# Patient Record
Sex: FEMALE | Race: WHITE | NOT HISPANIC OR LATINO | Employment: OTHER | URBAN - METROPOLITAN AREA
[De-identification: names, ages, dates, MRNs, and addresses within clinical notes are randomized per-mention and may not be internally consistent; named-entity substitution may affect disease eponyms.]

---

## 2017-01-17 ENCOUNTER — OFFICE VISIT (OUTPATIENT)
Dept: NEPHROLOGY | Facility: MEDICAL CENTER | Age: 77
End: 2017-01-17
Payer: COMMERCIAL

## 2017-01-17 VITALS
SYSTOLIC BLOOD PRESSURE: 140 MMHG | TEMPERATURE: 97.5 F | HEIGHT: 64 IN | RESPIRATION RATE: 14 BRPM | HEART RATE: 74 BPM | WEIGHT: 174 LBS | DIASTOLIC BLOOD PRESSURE: 80 MMHG | BODY MASS INDEX: 29.71 KG/M2

## 2017-01-17 DIAGNOSIS — E87.1 HYPONATREMIA: ICD-10-CM

## 2017-01-17 DIAGNOSIS — N18.30 CKD (CHRONIC KIDNEY DISEASE) STAGE 3, GFR 30-59 ML/MIN (HCC): ICD-10-CM

## 2017-01-17 DIAGNOSIS — I48.0 PAROXYSMAL ATRIAL FIBRILLATION (HCC): ICD-10-CM

## 2017-01-17 DIAGNOSIS — I10 ESSENTIAL HYPERTENSION: ICD-10-CM

## 2017-01-17 PROCEDURE — 99204 OFFICE O/P NEW MOD 45 MIN: CPT | Performed by: INTERNAL MEDICINE

## 2017-01-17 ASSESSMENT — ENCOUNTER SYMPTOMS
CHILLS: 0
NAUSEA: 0
FEVER: 0
ORTHOPNEA: 0
NECK PAIN: 0
HYPERTENSION: 1
SHORTNESS OF BREATH: 0
MYALGIAS: 0
VOMITING: 0

## 2017-01-17 NOTE — MR AVS SNAPSHOT
"        Lilia Pineda   2017 10:45 AM   Office Visit   MRN: [id number]    Department:  [facility]   Dept Phone:  [phone]    Description:  Female : 1940   Provider:  Fadi Najjar, M.D.           Reason for Visit     New Patient           Allergies as of 2017     No Known Allergies      You were diagnosed with     CKD (chronic kidney disease) stage 3, GFR 30-59 ml/min   [215070]       Essential hypertension   [5554194]       Hyponatremia   [597600]       Paroxysmal atrial fibrillation (CMS-HCC)   [948505]         Vital Signs     Blood Pressure Pulse Temperature Respirations Height Weight    140/80 mmHg 74 36.4 °C (97.5 °F) (Temporal) 14 1.626 m (5' 4\") 78.926 kg (174 lb)    Body Mass Index Smoking Status                29.85 kg/m2 Never Smoker           Basic Information     Date Of Birth Sex Race Ethnicity Preferred Language    1940 Female White Non- English      Your appointments     2017 10:45 AM   New Patient with Fadi Najjar, M.D.   [facility] ([facility] [place])    [address] [place] [address] [facility] [address]  [place] [address]   [phone]           Please bring Photo ID, Insurance Cards, All Medication Bottles and copies of any legal documents (such as Living Will, Power of ) If speaking a language besides English please bring an adult . Please arrive 30 minutes prior for check in and registration. You will be receiving a confirmation call a few days before your appointment from our automated call confirmation system.            2017 11:40 AM   ANNUAL EXAM PREVENTATIVE with MONY Pimentel   [facility] [address]  [place] [address]   [phone]            Mar 28, 2017  9:45 AM   Follow Up Visit with Fadi Najjar, M.D.   [facility] ([facility] [place])    [address] [place] [address] [facility] [address]  [place] [address]   [phone]           You will " be receiving a confirmation call a few days before your appointment from our automated call confirmation system.            Jun 28, 2017 10:00 AM   FOLLOW UP with Manolo Luis M.D.   [facility] (--)    [address]  [place] [address]   [phone]              Problem List              ICD-10-CM Priority Class Noted - Resolved    Hypertension I10 Medium  10/29/2013 - Present    Hyperlipidemia E78.5 Medium  10/29/2013 - Present    History of TIA (transient ischemic attack) Z86.73 Low  10/29/2013 - Present    Chronic back pain greater than 3 months duration M54.9, G89.29   10/31/2013 - Present    Neck pain M54.2   1/9/2014 - Present    Chronic post-traumatic headache G44.329 Medium  1/9/2014 - Present    Vascular abnormality I99.9   2/27/2014 - Present    Anxiety F41.9   10/6/2014 - Present    Risk for falls Z91.81 Low  10/26/2015 - Present    CKD (chronic kidney disease) stage 3, GFR 30-59 ml/min N18.3 High  10/27/2015 - Present    Chronic anticoagulation Z79.01   4/8/2016 - Present    Heart block AV first degree I44.0   4/8/2016 - Present    Paroxysmal atrial fibrillation (CMS-HCC) I48.0   6/29/2016 - Present    High serum creatinine R79.89   7/5/2016 - Present    Anticoagulant long-term use Z79.01   12/27/2016 - Present    Hyponatremia E87.1   1/17/2017 - Present      Health Maintenance        Date Due Completion Dates    IMM DTaP/Tdap/Td Vaccine (1 - Tdap) 1/6/1957 ---    IMM ZOSTER VACCINE 1/6/1998 ---    BONE DENSITY 1/6/2003 ---    MAMMOGRAM 1/1/2012 1/1/2011 (Done)    Override on 1/1/2011: Done (NL)    COLONOSCOPY 1/1/2015 1/1/2005 (Done)    Override on 1/1/2005: Done (1 polyp.  normal per patient.)            Current Immunizations     13-VALENT PCV PREVNAR 10/4/2016    Influenza TIV (IM) 10/19/2015, 10/10/2013    Influenza Vaccine Adult HD 10/4/2016    Pneumococcal polysaccharide vaccine (PPSV-23) 1/10/2006      Below and/or attached are the medications  your provider expects you to take. Review all of your home medications and newly ordered medications with your provider and/or pharmacist. Follow medication instructions as directed by your provider and/or pharmacist. Please keep your medication list with you and share with your provider. Update the information when medications are discontinued, doses are changed, or new medications (including over-the-counter products) are added; and carry medication information at all times in the event of emergency situations     Allergies:  No Known Allergies          Medications  Valid as of: January 17, 2017 - 10:43 AM    Generic Name Brand Name Tablet Size Instructions for use    Acetaminophen-Codeine (Tab) TYLENOL #3 300-30 MG TAKE 1 TABLET BY MOUTH EVERY 6 HOURS AS NEEDED        Amitriptyline HCl (Tab) ELAVIL 75 MG TAKE 1 TABLET BY MOUTH EVERY BEDTIME.        Atorvastatin Calcium (Tab) LIPITOR 20 MG Take 1 Tab by mouth every bedtime.        DiazePAM (Tab) VALIUM 10 MG TAKE 1 TABLET BY MOUTH TWICE A DAY AS NEEDED FOR ANXIETY.        Gemfibrozil (Tab) LOPID 600 MG Take 1 Tab by mouth 2 times a day.        Lisinopril (Tab) PRINIVIL 10 MG Take 1 Tab by mouth 2 times a day.        Potassium Chloride Velia CR (Tab CR) K-DUR 20 MEQ Take 1 Tab by mouth 2 times a day.        Rivaroxaban (Tab) XARELTO 15 MG TAKE 1 TABLET BY MOUTH WITH DINNER.        Sotalol HCl (Tab) BETAPACE 80 MG Take 1 Tab by mouth every day.        TraZODone HCl (Tab) DESYREL 150 MG Take 1-2 Tabs by mouth every bedtime.        .                 Medicines prescribed today were sent to:     [id number] - [place] - [address]    [address] [place] [address]    Phone: [phone] Fax: [phone]    Open 24 Hours?: No    [facility] - [place] - [address] AT PORTAL TO REGISTERED [facility] [address]    [address] [facility] [address]    Phone: [phone] Fax: [phone]    Open 24 Hours?: No         Medication refill instructions:       If your prescription bottle indicates you have medication refills left, it is not necessary to call your provider’s office. Please contact your pharmacy and they will refill your medication.    If your prescription bottle indicates you do not have any refills left, you may request refills at any time through one of the following ways: The online [employer] system (except Urgent Care), by calling your provider’s office, or by asking your pharmacy to contact your provider’s office with a refill request. Medication refills are processed only during regular business hours and may not be available until the next business day. Your provider may request additional information or to have a follow-up visit with you prior to refilling your medication.   *Please Note: Medication refills are assigned a new Rx number when refilled electronically. Your pharmacy may indicate that no refills were authorized even though a new prescription for the same medication is available at the pharmacy. Please request the medicine by name with the pharmacy before contacting your provider for a refill.        Your To Do List     Future Labs/Procedures Complete By Expires    BASIC METABOLIC PANEL  As directed 1/17/2018         [employer] Status: Patient Declined

## 2017-01-18 NOTE — PROGRESS NOTES
"Subjective:      Lilia Pineda is a 77 y.o. female who presents with Chronic Kidney Disease and Hypertension            Chronic Kidney Disease  This is a chronic problem. The current episode started more than 1 year ago. The problem occurs constantly. The problem has been unchanged. Pertinent negatives include no chest pain, chills, fever, myalgias, nausea, neck pain, urinary symptoms or vomiting. Nothing aggravates the symptoms.   Hypertension  This is a chronic problem. The current episode started more than 1 year ago. The problem is unchanged. The problem is controlled. Pertinent negatives include no chest pain, neck pain, orthopnea, peripheral edema or shortness of breath. There are no associated agents to hypertension. Risk factors for coronary artery disease include post-menopausal state and dyslipidemia. Past treatments include diuretics and ACE inhibitors. The current treatment provides significant improvement. There are no compliance problems.  Hypertensive end-organ damage includes kidney disease. Identifiable causes of hypertension include chronic renal disease.       Review of Systems   Constitutional: Negative for fever and chills.   Respiratory: Negative for shortness of breath.    Cardiovascular: Negative for chest pain and orthopnea.   Gastrointestinal: Negative for nausea and vomiting.   Genitourinary: Negative for dysuria, urgency and frequency.   Musculoskeletal: Negative for myalgias and neck pain.   All other systems reviewed and are negative.         Objective:     /80 mmHg  Pulse 74  Temp(Src) 36.4 °C (97.5 °F) (Temporal)  Resp 14  Ht 1.626 m (5' 4\")  Wt 78.926 kg (174 lb)  BMI 29.85 kg/m2     Physical Exam   Constitutional: She is oriented to person, place, and time. She appears well-developed and well-nourished. No distress.   HENT:   Head: Normocephalic and atraumatic.   Nose: Nose normal.   Eyes: Right eye exhibits no discharge. Left eye exhibits no discharge. No scleral " icterus.   Neck: No JVD present. No tracheal deviation present. No thyromegaly present.   Cardiovascular: An irregular rhythm present.   No murmur heard.  Pulmonary/Chest: Effort normal and breath sounds normal. No respiratory distress. She has no wheezes.   Abdominal: Soft. She exhibits no distension. There is no tenderness.   Musculoskeletal: She exhibits no edema.   Lymphadenopathy:     She has no cervical adenopathy.   Neurological: She is alert and oriented to person, place, and time.   Skin: Skin is warm and dry. She is not diaphoretic. No erythema.   Psychiatric: She has a normal mood and affect. Her behavior is normal.   Nursing note and vitals reviewed.    PMH,SH,FH,Medication list and medication allergy were reviewed and updated today  Old records were reviewed          Assessment/Plan:     1. CKD (chronic kidney disease) stage 3, GFR 30-59 ml/min  Most likely is hypertensive nephrosclerosis  Patient has no uremic symptoms  Renal dose all medication  Avoid nephrotoxins  Decreased potassium supplement  Follow-up labs  2. Essential hypertension  Controlled  Continue low salt diet    3. Hyponatremia  Patient was taking hydrochlorothiazide  I advised the patient to stop hydrochlorothiazide  Repeat labs    4. Paroxysmal atrial fibrillation (CMS-HCC)

## 2017-01-30 ENCOUNTER — TELEPHONE (OUTPATIENT)
Dept: MEDICAL GROUP | Facility: CLINIC | Age: 77
End: 2017-01-30

## 2017-01-30 ENCOUNTER — HOSPITAL ENCOUNTER (OUTPATIENT)
Facility: MEDICAL CENTER | Age: 77
End: 2017-01-30
Attending: NURSE PRACTITIONER
Payer: COMMERCIAL

## 2017-01-30 ENCOUNTER — OFFICE VISIT (OUTPATIENT)
Dept: MEDICAL GROUP | Facility: CLINIC | Age: 77
End: 2017-01-30
Payer: COMMERCIAL

## 2017-01-30 VITALS
SYSTOLIC BLOOD PRESSURE: 138 MMHG | HEART RATE: 78 BPM | OXYGEN SATURATION: 95 % | BODY MASS INDEX: 29.71 KG/M2 | DIASTOLIC BLOOD PRESSURE: 70 MMHG | WEIGHT: 174 LBS | TEMPERATURE: 96.9 F | HEIGHT: 64 IN | RESPIRATION RATE: 16 BRPM

## 2017-01-30 DIAGNOSIS — Z00.00 MEDICARE ANNUAL WELLNESS VISIT, SUBSEQUENT: ICD-10-CM

## 2017-01-30 DIAGNOSIS — I10 ESSENTIAL HYPERTENSION: ICD-10-CM

## 2017-01-30 DIAGNOSIS — E78.49 OTHER HYPERLIPIDEMIA: ICD-10-CM

## 2017-01-30 DIAGNOSIS — N18.30 CKD (CHRONIC KIDNEY DISEASE) STAGE 3, GFR 30-59 ML/MIN (HCC): ICD-10-CM

## 2017-01-30 DIAGNOSIS — I48.0 PAROXYSMAL ATRIAL FIBRILLATION (HCC): ICD-10-CM

## 2017-01-30 DIAGNOSIS — F51.01 PRIMARY INSOMNIA: ICD-10-CM

## 2017-01-30 DIAGNOSIS — I44.0 HEART BLOCK AV FIRST DEGREE: ICD-10-CM

## 2017-01-30 DIAGNOSIS — F41.9 ANXIETY: ICD-10-CM

## 2017-01-30 DIAGNOSIS — R39.198 SUBJECTIVE CHANGE IN URINATION: ICD-10-CM

## 2017-01-30 DIAGNOSIS — F33.1 MODERATE EPISODE OF RECURRENT MAJOR DEPRESSIVE DISORDER (HCC): ICD-10-CM

## 2017-01-30 DIAGNOSIS — Z79.01 ANTICOAGULANT LONG-TERM USE: ICD-10-CM

## 2017-01-30 DIAGNOSIS — Z78.0 POSTMENOPAUSAL: ICD-10-CM

## 2017-01-30 PROBLEM — F32.9 MAJOR DEPRESSIVE DISORDER: Status: ACTIVE | Noted: 2017-01-30

## 2017-01-30 LAB
APPEARANCE UR: NORMAL
BILIRUB UR STRIP-MCNC: NORMAL MG/DL
COLOR UR AUTO: YELLOW
GLUCOSE UR STRIP.AUTO-MCNC: NORMAL MG/DL
KETONES UR STRIP.AUTO-MCNC: NORMAL MG/DL
LEUKOCYTE ESTERASE UR QL STRIP.AUTO: NORMAL
NITRITE UR QL STRIP.AUTO: POSITIVE
PH UR STRIP.AUTO: 7.5 [PH] (ref 5–8)
PROT UR QL STRIP: NORMAL MG/DL
RBC UR QL AUTO: NORMAL
SP GR UR STRIP.AUTO: 1.02
UROBILINOGEN UR STRIP-MCNC: NORMAL MG/DL

## 2017-01-30 PROCEDURE — G8510 SCR DEP NEG, NO PLAN REQD: HCPCS | Performed by: NURSE PRACTITIONER

## 2017-01-30 PROCEDURE — G8420 CALC BMI NORM PARAMETERS: HCPCS | Performed by: NURSE PRACTITIONER

## 2017-01-30 PROCEDURE — G8482 FLU IMMUNIZE ORDER/ADMIN: HCPCS | Performed by: NURSE PRACTITIONER

## 2017-01-30 PROCEDURE — 1036F TOBACCO NON-USER: CPT | Performed by: NURSE PRACTITIONER

## 2017-01-30 PROCEDURE — 1101F PT FALLS ASSESS-DOCD LE1/YR: CPT | Performed by: NURSE PRACTITIONER

## 2017-01-30 PROCEDURE — 4040F PNEUMOC VAC/ADMIN/RCVD: CPT | Performed by: NURSE PRACTITIONER

## 2017-01-30 PROCEDURE — 81002 URINALYSIS NONAUTO W/O SCOPE: CPT | Performed by: NURSE PRACTITIONER

## 2017-01-30 PROCEDURE — 99214 OFFICE O/P EST MOD 30 MIN: CPT | Mod: 25 | Performed by: NURSE PRACTITIONER

## 2017-01-30 PROCEDURE — 87086 URINE CULTURE/COLONY COUNT: CPT

## 2017-01-30 PROCEDURE — G0439 PPPS, SUBSEQ VISIT: HCPCS | Performed by: NURSE PRACTITIONER

## 2017-01-30 RX ORDER — NITROFURANTOIN 25; 75 MG/1; MG/1
100 CAPSULE ORAL 2 TIMES DAILY
Qty: 14 CAP | Refills: 0 | Status: SHIPPED | OUTPATIENT
Start: 2017-01-30 | End: 2017-02-06

## 2017-01-30 ASSESSMENT — PATIENT HEALTH QUESTIONNAIRE - PHQ9
5. POOR APPETITE OR OVEREATING: 3 - NEARLY EVERY DAY
CLINICAL INTERPRETATION OF PHQ2 SCORE: 3
SUM OF ALL RESPONSES TO PHQ QUESTIONS 1-9: 12
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
## 2017-01-30 NOTE — TELEPHONE ENCOUNTER
----- Message from MONY Pimentel sent at 1/30/2017 12:27 PM PST -----  Please call and let her know that I sent macrbid BID for 7 days to her pharm.  cx ordered.

## 2017-01-30 NOTE — MR AVS SNAPSHOT
"        Lilia Pineda   2017 12:00 PM   Office Visit   MRN: [id number]    Department:  [facility]   Dept Phone:  [phone]    Description:  Female : 1940   Provider:  MONY Pimentel           Reason for Visit     Annual Exam           Allergies as of 2017     Allergen Noted Reactions    Donnatal 2017       Other reaction(s): Headaches  Increased urination.    Sulfa Drugs 2017      You were diagnosed with     Medicare annual wellness visit, subsequent   [275405]       Moderate episode of recurrent major depressive disorder (CMS-HCC)   [4577886]       Anxiety   [803864]       Primary insomnia   [909117]       CKD (chronic kidney disease) stage 3, GFR 30-59 ml/min   [607346]       Other hyperlipidemia   [4810630]       Essential hypertension   [5250294]       Anticoagulant long-term use   [115933]       Paroxysmal atrial fibrillation (CMS-[facility])   [912139]       Heart block AV first degree   [088228]       Subjective change in urination   [209214]       Postmenopausal   [170484]         Vital Signs     Blood Pressure Pulse Temperature Respirations Height Weight    138/70 mmHg 78 36.1 °C (96.9 °F) 16 1.626 m (5' 4\") 78.926 kg (174 lb)    Body Mass Index Oxygen Saturation Smoking Status             29.85 kg/m2 95% Never Smoker          Basic Information     Date Of Birth Sex Race Ethnicity Preferred Language    1940 Female White Non- English      Your appointments     Mar 28, 2017  9:45 AM   Follow Up Visit with Fadi Najjar, M.D.   [facility] ([facility])    [address] [place] [address] [address]  [facility] [address]   [phone]           You will be receiving a confirmation call a few days before your appointment from our automated call confirmation system.            2017 10:00 AM   FOLLOW UP with Manolo Luis M.D.   [facility] (--)    [address] [facility]  [place] [address]   [phone]              Problem List              ICD-10-CM Priority Class Noted - Resolved    Hypertension I10 Medium  10/29/2013 - Present    Hyperlipidemia E78.5 Medium  10/29/2013 - Present    History of TIA (transient ischemic attack) Z86.73 Low  10/29/2013 - Present    Chronic back pain greater than 3 months duration M54.9, G89.29 Low  10/31/2013 - Present    Neck pain M54.2 Low  1/9/2014 - Present    Chronic post-traumatic headache G44.329 Medium  1/9/2014 - Present    Vascular abnormality I99.9   2/27/2014 - Present    Anxiety F41.9 Low  10/6/2014 - Present    Risk for falls Z91.81 Low  10/26/2015 - Present    CKD (chronic kidney disease) stage 3, GFR 30-59 ml/min N18.3 High  10/27/2015 - Present    Heart block AV first degree I44.0 Low  4/8/2016 - Present    Paroxysmal atrial fibrillation (CMS-HCC) I48.0 Medium  6/29/2016 - Present    history of High serum creatinine R79.89 Medium  7/5/2016 - Present    Anticoagulant long-term use-Xarelto Z79.01 High  12/27/2016 - Present    Hyponatremia E87.1 Low  1/17/2017 - Present    Steatosis of liver K76.0 Low  4/19/2007 - Present    Keratoconjunctivitis sicca, in Sjogren's syndrome (CMS-HCC) M35.01 Low  2/7/2012 - Present    Major depressive disorder F32.9   1/30/2017 - Present      Health Maintenance        Date Due Completion Dates    IMM DTaP/Tdap/Td Vaccine (1 - Tdap) 1/6/1957 ---    IMM ZOSTER VACCINE 1/6/1998 ---    BONE DENSITY 1/6/2003 ---            Results     POCT Urinalysis      Component Value Standard Range & Units    POC Color yellow Negative    POC Appearance cloudy Negative    POC Leukocyte Esterase large Negative    POC Nitrites positive Negative    POC Urobiligen neg Negative (0.2) mg/dL    POC Protein trace Negative mg/dL    POC Urine PH 7.5 5.0 - 8.0    POC Blood neg Negative    POC Specific Gravity 1.020 <1.005 - >1.030    POC Ketones neg Negative mg/dL    POC Biliruben neg Negative mg/dL    POC Glucose neg Negative mg/dL                         Current Immunizations     13-VALENT PCV PREVNAR 10/4/2016    Influenza TIV (IM) 10/19/2015, 10/10/2013    Influenza Vaccine Adult HD 10/4/2016    Pneumococcal polysaccharide vaccine (PPSV-23) 1/10/2006      Below and/or attached are the medications your provider expects you to take. Review all of your home medications and newly ordered medications with your provider and/or pharmacist. Follow medication instructions as directed by your provider and/or pharmacist. Please keep your medication list with you and share with your provider. Update the information when medications are discontinued, doses are changed, or new medications (including over-the-counter products) are added; and carry medication information at all times in the event of emergency situations     Allergies:  DONNATAL - (reactions not documented)     SULFA DRUGS - (reactions not documented)               Medications  Valid as of: January 30, 2017 - 12:05 PM    Generic Name Brand Name Tablet Size Instructions for use    Acetaminophen-Codeine (Tab) TYLENOL #3 300-30 MG TAKE 1 TABLET BY MOUTH EVERY 6 HOURS AS NEEDED        Amitriptyline HCl (Tab) ELAVIL 75 MG TAKE 1 TABLET BY MOUTH EVERY BEDTIME.        Atorvastatin Calcium (Tab) LIPITOR 20 MG Take 1 Tab by mouth every bedtime.        DiazePAM (Tab) VALIUM 10 MG TAKE 1 TABLET BY MOUTH TWICE A DAY AS NEEDED FOR ANXIETY.        Gemfibrozil (Tab) LOPID 600 MG Take 1 Tab by mouth 2 times a day.        Lisinopril (Tab) PRINIVIL 10 MG Take 1 Tab by mouth 2 times a day.        Potassium Chloride Velia CR (Tab CR) Kdur 20 MEQ Take 1 Tab by mouth 2 times a day.        Rivaroxaban (Tab) XARELTO 15 MG TAKE 1 TABLET BY MOUTH WITH DINNER.        Sotalol HCl (Tab) BETAPACE 80 MG Take 1 Tab by mouth every day.        TraZODone HCl (Tab) DESYREL 150 MG Take 1-2 Tabs by mouth every bedtime.        .                 Medicines prescribed today were sent to:     [facility]ACY #[id number] - [place], [place] - [address]    [address] [place] [address]    Phone: [phone] Fax: [phone]    Open 24 Hours?: No    [facility] - [place], [place] - [address] AT PORTAL TO REGISTERED [facility] SITES    [address] [facility] [address]    Phone: [phone] Fax: [phone]    Open 24 Hours?: No      Medication refill instructions:       If your prescription bottle indicates you have medication refills left, it is not necessary to call your provider’s office. Please contact your pharmacy and they will refill your medication.    If your prescription bottle indicates you do not have any refills left, you may request refills at any time through one of the following ways: The online [employer] system (except Urgent Care), by calling your provider’s office, or by asking your pharmacy to contact your provider’s office with a refill request. Medication refills are processed only during regular business hours and may not be available until the next business day. Your provider may request additional information or to have a follow-up visit with you prior to refilling your medication.   *Please Note: Medication refills are assigned a new Rx number when refilled electronically. Your pharmacy may indicate that no refills were authorized even though a new prescription for the same medication is available at the pharmacy. Please request the medicine by name with the pharmacy before contacting your provider for a refill.        Your To Do List     Future Labs/Procedures Complete By Expires    DS-BONE DENSITY STUDY (DEXA)  As directed 8/2/2017      Referral     A referral request has been sent to our patient care coordination department. Please allow 3-5 business days for us to process this request and contact you either by phone or mail. If you do not hear from us by the 5th business day, please call us at [phone].           [employer] Status: Patient Declined

## 2017-02-01 LAB
BACTERIA UR CULT: NORMAL
SIGNIFICANT IND 70042: NORMAL
SOURCE SOURCE: NORMAL

## 2017-02-06 DIAGNOSIS — I48.19 PERSISTENT ATRIAL FIBRILLATION (HCC): ICD-10-CM

## 2017-02-06 RX ORDER — SOTALOL HYDROCHLORIDE 80 MG/1
TABLET ORAL
Qty: 180 TAB | Refills: 3 | Status: SHIPPED | OUTPATIENT
Start: 2017-02-06 | End: 2018-01-24 | Stop reason: SDUPTHER

## 2017-02-07 ENCOUNTER — TELEPHONE (OUTPATIENT)
Dept: CARDIOLOGY | Facility: MEDICAL CENTER | Age: 77
End: 2017-02-07

## 2017-02-07 DIAGNOSIS — R60.9 EDEMA, UNSPECIFIED TYPE: ICD-10-CM

## 2017-02-07 RX ORDER — POTASSIUM CHLORIDE 20 MEQ/1
20 TABLET, EXTENDED RELEASE ORAL DAILY
Qty: 90 TAB | Refills: 3 | COMMUNITY
Start: 2017-02-07 | End: 2017-10-23 | Stop reason: SDUPTHER

## 2017-02-07 NOTE — TELEPHONE ENCOUNTER
----- Message from Kiley Silva, Med Ass't sent at 2/7/2017 10:26 AM PST -----  Regarding: RX concern  Sorry about this, the patient called and left a message that a was a bit hard to understand but from the sounds of it she is concerned about her dose for potassium and her sotalol and would like a call back

## 2017-02-07 NOTE — TELEPHONE ENCOUNTER
Spoke to Eduardo at [facility] and med (sotalol) is packed and will be shipped tomorrow. Pt notified.

## 2017-02-22 DIAGNOSIS — F41.9 ANXIETY: ICD-10-CM

## 2017-02-22 DIAGNOSIS — G44.329 CHRONIC POST-TRAUMATIC HEADACHE, NOT INTRACTABLE: ICD-10-CM

## 2017-02-22 RX ORDER — DIAZEPAM 10 MG/1
TABLET ORAL
Qty: 60 TAB | Refills: 2 | Status: SHIPPED
Start: 2017-02-22 | End: 2017-04-24 | Stop reason: SDUPTHER

## 2017-03-10 ENCOUNTER — APPOINTMENT (OUTPATIENT)
Dept: BEHAVIORAL HEALTH | Facility: PHYSICIAN GROUP | Age: 77
End: 2017-03-10
Payer: COMMERCIAL

## 2017-03-16 DIAGNOSIS — I10 ESSENTIAL HYPERTENSION: ICD-10-CM

## 2017-03-16 LAB
BUN SERPL-MCNC: 37 MG/DL (ref 8–27)
BUN/CREAT SERPL: 36 (ref 11–26)
CALCIUM SERPL-MCNC: 9.9 MG/DL (ref 8.7–10.3)
CHLORIDE SERPL-SCNC: 96 MMOL/L (ref 96–106)
CO2 SERPL-SCNC: 28 MMOL/L (ref 18–29)
CREAT SERPL-MCNC: 1.04 MG/DL (ref 0.57–1)
GLUCOSE SERPL-MCNC: 100 MG/DL (ref 65–99)
POTASSIUM SERPL-SCNC: 4.2 MMOL/L (ref 3.5–5.2)
SODIUM SERPL-SCNC: 139 MMOL/L (ref 134–144)

## 2017-03-16 RX ORDER — LISINOPRIL 10 MG/1
TABLET ORAL
Qty: 180 TAB | Refills: 3 | Status: SHIPPED | OUTPATIENT
Start: 2017-03-16

## 2017-03-28 ENCOUNTER — OFFICE VISIT (OUTPATIENT)
Dept: NEPHROLOGY | Facility: MEDICAL CENTER | Age: 77
End: 2017-03-28
Payer: COMMERCIAL

## 2017-03-28 VITALS
WEIGHT: 173 LBS | HEIGHT: 64 IN | TEMPERATURE: 98.7 F | OXYGEN SATURATION: 95 % | DIASTOLIC BLOOD PRESSURE: 80 MMHG | HEART RATE: 74 BPM | BODY MASS INDEX: 29.53 KG/M2 | SYSTOLIC BLOOD PRESSURE: 130 MMHG | RESPIRATION RATE: 20 BRPM

## 2017-03-28 DIAGNOSIS — I10 ESSENTIAL HYPERTENSION: ICD-10-CM

## 2017-03-28 DIAGNOSIS — N18.30 CKD (CHRONIC KIDNEY DISEASE) STAGE 3, GFR 30-59 ML/MIN (HCC): ICD-10-CM

## 2017-03-28 DIAGNOSIS — H33.009: ICD-10-CM

## 2017-03-28 PROCEDURE — G8482 FLU IMMUNIZE ORDER/ADMIN: HCPCS | Performed by: INTERNAL MEDICINE

## 2017-03-28 PROCEDURE — G8419 CALC BMI OUT NRM PARAM NOF/U: HCPCS | Performed by: INTERNAL MEDICINE

## 2017-03-28 PROCEDURE — 1036F TOBACCO NON-USER: CPT | Performed by: INTERNAL MEDICINE

## 2017-03-28 PROCEDURE — 99214 OFFICE O/P EST MOD 30 MIN: CPT | Performed by: INTERNAL MEDICINE

## 2017-03-28 PROCEDURE — 1101F PT FALLS ASSESS-DOCD LE1/YR: CPT | Performed by: INTERNAL MEDICINE

## 2017-03-28 PROCEDURE — G8432 DEP SCR NOT DOC, RNG: HCPCS | Performed by: INTERNAL MEDICINE

## 2017-03-28 PROCEDURE — 4040F PNEUMOC VAC/ADMIN/RCVD: CPT | Performed by: INTERNAL MEDICINE

## 2017-03-28 ASSESSMENT — ENCOUNTER SYMPTOMS
CHILLS: 0
SHORTNESS OF BREATH: 0
NAUSEA: 0
FEVER: 0
VOMITING: 0
HYPERTENSION: 1

## 2017-03-28 NOTE — MR AVS SNAPSHOT
"        Lilia Pineda   3/28/2017 9:45 AM   Office Visit   MRN: [id number]    Department:  [facility]   Dept Phone:  [phone]    Description:  Female : 1940   Provider:  Fadi Najjar, M.D.           Reason for Visit     Follow-Up           Allergies as of 3/28/2017     Allergen Noted Reactions    Donnatal 2017       Other reaction(s): Headaches  Increased urination.    Sulfa Drugs 2017-12-18      You were diagnosed with     Essential hypertension   [2720751]       CKD (chronic kidney disease) stage 3, GFR 30-59 ml/min   [291404]       Retinal detachment with retinal defect, unspecified   [361.00.ICD-9-CM]         Vital Signs     Blood Pressure Pulse Temperature Respirations Height Weight    130/80 mmHg 74 37.1 °C (98.7 °F) 20 1.626 m (5' 4.02\") 78.472 kg (173 lb)    Body Mass Index Oxygen Saturation Smoking Status             29.68 kg/m2 95% Never Smoker          Basic Information     Date Of Birth Sex Race Ethnicity Preferred Language    1940 Female White Non- English      Your appointments     2017 11:30 AM   Initial Psychiatric Eval with Alina Ramirez M.D.   [facility] [address] [place] [place] [address] ([facility])    [address] [place] [address]  [address]  [facility] [address]   [phone]            2017 10:00 AM   FOLLOW UP with Manolo Luis M.D.   [facility] (--)    [address] [place] [address] [address]  [facility] [address] [phone]              Problem List              ICD-10-CM Priority Class Noted - Resolved    Hypertension I10 Medium  10/29/2013 - Present    Hyperlipidemia E78.5 Medium  10/29/2013 - Present    History of TIA (transient ischemic attack) Z86.73 Low  10/29/2013 - Present    Chronic back pain greater than 3 months duration M54.9, G89.29 Low  10/31/2013 - Present    Neck pain M54.2 Low  2014 - Present    Chronic post-traumatic headache G44.329 Medium  2014 - Present    Vascular abnormality I99.9   2014 - " Present    Anxiety F41.9 Low  10/6/2014 - Present    Risk for falls Z91.81 Low  10/26/2015 - Present    CKD (chronic kidney disease) stage 3, GFR 30-59 ml/min N18.3 High  10/27/2015 - Present    Heart block AV first degree I44.0 Low  4/8/2016 - Present    Paroxysmal atrial fibrillation (CMS-HCC) I48.0 Medium  6/29/2016 - Present    history of High serum creatinine R79.89 Medium  7/5/2016 - Present    Anticoagulant long-term use-Xarelto Z79.01 High  12/27/2016 - Present    Hyponatremia E87.1 Low  1/17/2017 - Present    Steatosis of liver K76.0 Low  4/19/2007 - Present    Keratoconjunctivitis sicca, in Sjogren's syndrome (CMS-HCC) M35.01 Low  2/7/2012 - Present    Major depressive disorder F32.9   1/30/2017 - Present      Health Maintenance        Date Due Completion Dates    IMM DTaP/Tdap/Td Vaccine (1 - Tdap) 1/6/1957 ---    IMM ZOSTER VACCINE 1/6/1998 ---    BONE DENSITY 1/6/2003 ---            Current Immunizations     13-VALENT PCV PREVNAR 10/4/2016    Influenza TIV (IM) 10/19/2015, 10/10/2013    Influenza Vaccine Adult HD 10/4/2016    Pneumococcal polysaccharide vaccine (PPSV-23) 1/10/2006      Below and/or attached are the medications your provider expects you to take. Review all of your home medications and newly ordered medications with your provider and/or pharmacist. Follow medication instructions as directed by your provider and/or pharmacist. Please keep your medication list with you and share with your provider. Update the information when medications are discontinued, doses are changed, or new medications (including over-the-counter products) are added; and carry medication information at all times in the event of emergency situations     Allergies:  DONNATAL - (reactions not documented)     SULFA DRUGS - (reactions not documented)               Medications  Valid as of: March 28, 2017 - 10:05 AM    Generic Name Brand Name Tablet Size Instructions for use    Acetaminophen-Codeine (Tab) TYLENOL #3 300-30  MG TAKE 1 TABLET BY MOUTH EVERY 6 HOURS AS NEEDED.        Amitriptyline HCl (Tab) ELAVIL 75 MG TAKE 1 TABLET BY MOUTH EVERY BEDTIME.        Atorvastatin Calcium (Tab) LIPITOR 20 MG Take 1 Tab by mouth every bedtime.        DiazePAM (Tab) VALIUM 10 MG TAKE 1 TABLET BY MOUTH TWICE A DAY AS NEEDED FOR ANXIETY.        Gemfibrozil (Tab) LOPID 600 MG Take 1 Tab by mouth 2 times a day.        Lisinopril (Tab) PRINIVIL 10 MG Take 1 Tab by mouth 2 times a day.        Lisinopril (Tab) PRINIVIL 10 MG TAKE 1 TABLET TWICE A DAY        Potassium Chloride Velia CR (Tab CR) Kdur 20 MEQ Take 1 Tab by mouth every day.        Rivaroxaban (Tab) XARELTO 15 MG TAKE 1 TABLET BY MOUTH WITH DINNER.        Sotalol HCl (Tab) BETAPACE 80 MG Take 1 Tab by mouth every day.        Sotalol HCl (Tab) BETAPACE 80 MG TAKE 1 TABLET TWICE A DAY        TraZODone HCl (Tab) DESYREL 150 MG Take 1-2 Tabs by mouth every bedtime.        .                 Medicines prescribed today were sent to:     [facility]/PHARMACY #[id number] - [place] - [address]    [address] [place] [address]    Phone: [phone] Fax: [phone]    Open 24 Hours?: No    [facility] - [place], [place] - [address] AT PORTAL TO REGISTERED [facility] SITES    [facility] [address] [facility] [address]    Phone: [phone] Fax: [phone]    Open 24 Hours?: No      Medication refill instructions:       If your prescription bottle indicates you have medication refills left, it is not necessary to call your provider’s office. Please contact your pharmacy and they will refill your medication.    If your prescription bottle indicates you do not have any refills left, you may request refills at any time through one of the following ways: The online [employer] system (except Urgent Care), by calling your provider’s office, or by asking your pharmacy to contact your provider’s office with a refill request. Medication refills are processed only during regular business  hours and may not be available until the next business day. Your provider may request additional information or to have a follow-up visit with you prior to refilling your medication.   *Please Note: Medication refills are assigned a new Rx number when refilled electronically. Your pharmacy may indicate that no refills were authorized even though a new prescription for the same medication is available at the pharmacy. Please request the medicine by name with the pharmacy before contacting your provider for a refill.        Your To Do List     Future Labs/Procedures Complete By Expires    BASIC METABOLIC PANEL  As directed 3/28/2018    CBC WITH DIFFERENTIAL  As directed 3/28/2018      Referral     A referral request has been sent to our patient care coordination department. Please allow 3-5 business days for us to process this request and contact you either by phone or mail. If you do not hear from us by the 5th business day, please call us at [phone].           MyChart Status: Patient Declined

## 2017-03-28 NOTE — PROGRESS NOTES
"Subjective:      Lilia Pineda is a 77 y.o. female who presents with Hypertension and Chronic Kidney Disease            Hypertension  This is a chronic problem. The current episode started more than 1 year ago. The problem is unchanged. The problem is controlled. Pertinent negatives include no chest pain, peripheral edema or shortness of breath. Risk factors for coronary artery disease include post-menopausal state and dyslipidemia. Past treatments include ACE inhibitors. The current treatment provides significant improvement. There are no compliance problems.  Hypertensive end-organ damage includes kidney disease. Identifiable causes of hypertension include chronic renal disease.   Chronic Kidney Disease  This is a chronic problem. The current episode started more than 1 year ago. The problem occurs constantly. The problem has been unchanged. Pertinent negatives include no chest pain, chills, fever, nausea, urinary symptoms or vomiting.       Review of Systems   Constitutional: Negative for fever and chills.   Respiratory: Negative for shortness of breath.    Cardiovascular: Negative for chest pain and leg swelling.   Gastrointestinal: Negative for nausea and vomiting.   Genitourinary: Negative for dysuria and urgency.          Objective:     /80 mmHg  Pulse 74  Temp(Src) 37.1 °C (98.7 °F)  Resp 20  Ht 1.626 m (5' 4.02\")  Wt 78.472 kg (173 lb)  BMI 29.68 kg/m2  SpO2 95%     Physical Exam   Constitutional: She is oriented to person, place, and time. She appears well-developed and well-nourished. No distress.   HENT:   Head: Normocephalic and atraumatic.   Nose: Nose normal.   Eyes: Right eye exhibits no discharge. Left eye exhibits no discharge. No scleral icterus.   Cardiovascular: Normal rate and regular rhythm.    Pulmonary/Chest: Effort normal.   Musculoskeletal: She exhibits no edema.   Neurological: She is alert and oriented to person, place, and time.   Skin: Skin is warm and dry. She is not " diaphoretic.   Psychiatric: She has a normal mood and affect. Her behavior is normal.   Nursing note and vitals reviewed.              Assessment/Plan:     1. Essential hypertension  Controlled  Continue the low-salt diet    2. CKD (chronic kidney disease) stage 3, GFR 30-59 ml/min  Stable  No uremic symptoms  Renal dose all medication  Avoid nephrotoxins    3. Retinal detachment with retinal defect, unspecified  Patient has a history of retinal detachment   We will refer to retina specialist

## 2017-04-06 DIAGNOSIS — I10 ESSENTIAL HYPERTENSION: ICD-10-CM

## 2017-04-07 RX ORDER — POTASSIUM CHLORIDE 1500 MG/1
TABLET, EXTENDED RELEASE ORAL
Qty: 180 TAB | Refills: 3 | Status: SHIPPED | OUTPATIENT
Start: 2017-04-07 | End: 2017-10-23

## 2017-04-11 ENCOUNTER — APPOINTMENT (OUTPATIENT)
Dept: BEHAVIORAL HEALTH | Facility: PHYSICIAN GROUP | Age: 77
End: 2017-04-11
Payer: COMMERCIAL

## 2017-04-24 DIAGNOSIS — F41.9 ANXIETY: ICD-10-CM

## 2017-04-24 RX ORDER — DIAZEPAM 10 MG/1
TABLET ORAL
Qty: 60 TAB | Refills: 2 | Status: SHIPPED
Start: 2017-04-24 | End: 2017-09-28 | Stop reason: SDUPTHER

## 2017-04-24 RX ORDER — TRAZODONE HYDROCHLORIDE 150 MG/1
TABLET ORAL
Qty: 60 TAB | Refills: 5 | Status: SHIPPED | OUTPATIENT
Start: 2017-04-24 | End: 2017-10-19 | Stop reason: SDUPTHER

## 2017-04-26 DIAGNOSIS — I10 ESSENTIAL HYPERTENSION: ICD-10-CM

## 2017-04-26 DIAGNOSIS — E78.5 HYPERLIPIDEMIA, UNSPECIFIED HYPERLIPIDEMIA TYPE: ICD-10-CM

## 2017-04-26 RX ORDER — GEMFIBROZIL 600 MG/1
600 TABLET, FILM COATED ORAL 2 TIMES DAILY
Qty: 180 TAB | Refills: 1 | Status: SHIPPED | OUTPATIENT
Start: 2017-04-26 | End: 2017-10-04 | Stop reason: SDUPTHER

## 2017-08-21 DIAGNOSIS — I10 ESSENTIAL HYPERTENSION: ICD-10-CM

## 2017-08-21 DIAGNOSIS — E78.5 HYPERLIPIDEMIA, UNSPECIFIED HYPERLIPIDEMIA TYPE: ICD-10-CM

## 2017-08-21 RX ORDER — ATORVASTATIN CALCIUM 20 MG/1
20 TABLET, FILM COATED ORAL
Qty: 90 TAB | Refills: 1 | Status: SHIPPED | OUTPATIENT
Start: 2017-08-21 | End: 2018-02-15 | Stop reason: SDUPTHER

## 2017-08-22 NOTE — TELEPHONE ENCOUNTER
reviewed from state pharmacy database-Medications found to be medically necessary/appropriate.      Please call and notify that she will need a FU in the office for further refills (controlled subs, last seen 03/2017)

## 2017-09-18 ENCOUNTER — OFFICE VISIT (OUTPATIENT)
Dept: CARDIOLOGY | Facility: MEDICAL CENTER | Age: 77
End: 2017-09-18
Payer: COMMERCIAL

## 2017-09-18 VITALS
BODY MASS INDEX: 29.88 KG/M2 | HEART RATE: 74 BPM | SYSTOLIC BLOOD PRESSURE: 128 MMHG | WEIGHT: 175 LBS | DIASTOLIC BLOOD PRESSURE: 78 MMHG | OXYGEN SATURATION: 95 % | HEIGHT: 64 IN

## 2017-09-18 DIAGNOSIS — Z79.01 ANTICOAGULANT LONG-TERM USE: ICD-10-CM

## 2017-09-18 DIAGNOSIS — I48.0 PAF (PAROXYSMAL ATRIAL FIBRILLATION) (HCC): ICD-10-CM

## 2017-09-18 DIAGNOSIS — I48.0 PAROXYSMAL ATRIAL FIBRILLATION (HCC): ICD-10-CM

## 2017-09-18 DIAGNOSIS — F41.9 ANXIETY: ICD-10-CM

## 2017-09-18 DIAGNOSIS — I10 ESSENTIAL HYPERTENSION: ICD-10-CM

## 2017-09-18 DIAGNOSIS — Z86.73 HISTORY OF TIA (TRANSIENT ISCHEMIC ATTACK): ICD-10-CM

## 2017-09-18 LAB — EKG IMPRESSION: NORMAL

## 2017-09-18 PROCEDURE — 93000 ELECTROCARDIOGRAM COMPLETE: CPT | Performed by: INTERNAL MEDICINE

## 2017-09-18 PROCEDURE — 99214 OFFICE O/P EST MOD 30 MIN: CPT | Performed by: INTERNAL MEDICINE

## 2017-09-18 RX ORDER — LOTEPREDNOL ETABONATE 5 MG/G
GEL OPHTHALMIC
Refills: 3 | COMMUNITY
Start: 2017-08-17

## 2017-09-18 RX ORDER — NEOMYCIN SULFATE, POLYMYXIN B SULFATE, AND DEXAMETHASONE 3.5; 10000; 1 MG/G; [USP'U]/G; MG/G
OINTMENT OPHTHALMIC
Refills: 3 | COMMUNITY
Start: 2017-07-27 | End: 2017-10-23
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
## 2017-09-28 DIAGNOSIS — F41.9 ANXIETY: ICD-10-CM

## 2017-10-02 RX ORDER — DIAZEPAM 10 MG/1
TABLET ORAL
Qty: 60 TAB | Refills: 0 | Status: SHIPPED
Start: 2017-10-02 | End: 2017-10-27 | Stop reason: SDUPTHER

## 2017-10-03 ENCOUNTER — APPOINTMENT (OUTPATIENT)
Dept: NEPHROLOGY | Facility: MEDICAL CENTER | Age: 77
End: 2017-10-03
Payer: COMMERCIAL

## 2017-10-04 DIAGNOSIS — I10 ESSENTIAL HYPERTENSION: ICD-10-CM

## 2017-10-04 DIAGNOSIS — E78.5 HYPERLIPIDEMIA, UNSPECIFIED HYPERLIPIDEMIA TYPE: ICD-10-CM

## 2017-10-04 RX ORDER — GEMFIBROZIL 600 MG/1
600 TABLET, FILM COATED ORAL 2 TIMES DAILY
Qty: 180 TAB | Refills: 3 | Status: SHIPPED | OUTPATIENT
Start: 2017-10-04 | End: 2017-12-12 | Stop reason: SDUPTHER

## 2017-10-11 ENCOUNTER — HOSPITAL ENCOUNTER (OUTPATIENT)
Dept: LAB | Facility: MEDICAL CENTER | Age: 77
End: 2017-10-11
Attending: INTERNAL MEDICINE
Payer: COMMERCIAL

## 2017-10-11 DIAGNOSIS — N18.30 CKD (CHRONIC KIDNEY DISEASE) STAGE 3, GFR 30-59 ML/MIN (HCC): ICD-10-CM

## 2017-10-11 DIAGNOSIS — I10 ESSENTIAL HYPERTENSION: ICD-10-CM

## 2017-10-11 LAB
ANION GAP SERPL CALC-SCNC: 10 MMOL/L (ref 0–11.9)
BASOPHILS # BLD AUTO: 0.6 % (ref 0–1.8)
BASOPHILS # BLD: 0.05 K/UL (ref 0–0.12)
BUN SERPL-MCNC: 30 MG/DL (ref 8–22)
CALCIUM SERPL-MCNC: 9.6 MG/DL (ref 8.5–10.5)
CHLORIDE SERPL-SCNC: 101 MMOL/L (ref 96–112)
CO2 SERPL-SCNC: 24 MMOL/L (ref 20–33)
CREAT SERPL-MCNC: 0.87 MG/DL (ref 0.5–1.4)
CREAT UR-MCNC: 79.4 MG/DL
EOSINOPHIL # BLD AUTO: 0.14 K/UL (ref 0–0.51)
EOSINOPHIL NFR BLD: 1.8 % (ref 0–6.9)
ERYTHROCYTE [DISTWIDTH] IN BLOOD BY AUTOMATED COUNT: 43.3 FL (ref 35.9–50)
GFR SERPL CREATININE-BSD FRML MDRD: >60 ML/MIN/1.73 M 2
GLUCOSE SERPL-MCNC: 99 MG/DL (ref 65–99)
HCT VFR BLD AUTO: 43 % (ref 37–47)
HGB BLD-MCNC: 14.2 G/DL (ref 12–16)
IMM GRANULOCYTES # BLD AUTO: 0.03 K/UL (ref 0–0.11)
IMM GRANULOCYTES NFR BLD AUTO: 0.4 % (ref 0–0.9)
LYMPHOCYTES # BLD AUTO: 1.88 K/UL (ref 1–4.8)
LYMPHOCYTES NFR BLD: 24.3 % (ref 22–41)
MCH RBC QN AUTO: 31.8 PG (ref 27–33)
MCHC RBC AUTO-ENTMCNC: 33 G/DL (ref 33.6–35)
MCV RBC AUTO: 96.4 FL (ref 81.4–97.8)
MICROALBUMIN UR-MCNC: 2.1 MG/DL
MICROALBUMIN/CREAT UR: 26 MG/G (ref 0–30)
MONOCYTES # BLD AUTO: 0.7 K/UL (ref 0–0.85)
MONOCYTES NFR BLD AUTO: 9 % (ref 0–13.4)
NEUTROPHILS # BLD AUTO: 4.95 K/UL (ref 2–7.15)
NEUTROPHILS NFR BLD: 63.9 % (ref 44–72)
NRBC # BLD AUTO: 0 K/UL
NRBC BLD AUTO-RTO: 0 /100 WBC
PLATELET # BLD AUTO: 225 K/UL (ref 164–446)
PMV BLD AUTO: 13.1 FL (ref 9–12.9)
POTASSIUM SERPL-SCNC: 4 MMOL/L (ref 3.6–5.5)
RBC # BLD AUTO: 4.46 M/UL (ref 4.2–5.4)
SODIUM SERPL-SCNC: 135 MMOL/L (ref 135–145)
WBC # BLD AUTO: 7.8 K/UL (ref 4.8–10.8)

## 2017-10-11 PROCEDURE — 85025 COMPLETE CBC W/AUTO DIFF WBC: CPT

## 2017-10-11 PROCEDURE — 80048 BASIC METABOLIC PNL TOTAL CA: CPT

## 2017-10-11 PROCEDURE — 82043 UR ALBUMIN QUANTITATIVE: CPT

## 2017-10-11 PROCEDURE — 82570 ASSAY OF URINE CREATININE: CPT

## 2017-10-11 PROCEDURE — 36415 COLL VENOUS BLD VENIPUNCTURE: CPT

## 2017-10-12 DIAGNOSIS — F41.9 ANXIETY: ICD-10-CM

## 2017-10-12 DIAGNOSIS — G47.00 INSOMNIA, UNSPECIFIED TYPE: ICD-10-CM

## 2017-10-12 DIAGNOSIS — G44.329 CHRONIC POST-TRAUMATIC HEADACHE, NOT INTRACTABLE: ICD-10-CM

## 2017-10-12 RX ORDER — AMITRIPTYLINE HYDROCHLORIDE 75 MG/1
TABLET ORAL
Qty: 90 TAB | Refills: 3 | Status: SHIPPED | OUTPATIENT
Start: 2017-10-12 | End: 2017-10-29

## 2017-10-17 ENCOUNTER — APPOINTMENT (OUTPATIENT)
Dept: NEPHROLOGY | Facility: MEDICAL CENTER | Age: 77
End: 2017-10-17
Payer: COMMERCIAL

## 2017-10-18 ENCOUNTER — OFFICE VISIT (OUTPATIENT)
Dept: NEPHROLOGY | Facility: MEDICAL CENTER | Age: 77
End: 2017-10-18
Payer: COMMERCIAL

## 2017-10-18 VITALS
HEIGHT: 64 IN | RESPIRATION RATE: 16 BRPM | BODY MASS INDEX: 30.05 KG/M2 | WEIGHT: 176 LBS | SYSTOLIC BLOOD PRESSURE: 134 MMHG | OXYGEN SATURATION: 95 % | TEMPERATURE: 98.1 F | HEART RATE: 72 BPM | DIASTOLIC BLOOD PRESSURE: 80 MMHG

## 2017-10-18 DIAGNOSIS — I10 ESSENTIAL HYPERTENSION: ICD-10-CM

## 2017-10-18 DIAGNOSIS — N18.9 CHRONIC KIDNEY DISEASE, UNSPECIFIED CKD STAGE: ICD-10-CM

## 2017-10-18 PROCEDURE — 99213 OFFICE O/P EST LOW 20 MIN: CPT | Performed by: INTERNAL MEDICINE

## 2017-10-18 ASSESSMENT — ENCOUNTER SYMPTOMS
HYPERTENSION: 1
VOMITING: 0
NAUSEA: 0
SHORTNESS OF BREATH: 0

## 2017-10-18 NOTE — PROGRESS NOTES
"Subjective:      Lilia Pineda is a 77 y.o. female who presents with Hypertension and Chronic Kidney Disease            Hypertension   This is a chronic problem. The current episode started more than 1 year ago. The problem is unchanged. The problem is controlled. Pertinent negatives include no chest pain, peripheral edema or shortness of breath. There are no associated agents to hypertension. Risk factors for coronary artery disease include post-menopausal state and dyslipidemia. Past treatments include ACE inhibitors. The current treatment provides significant improvement. There are no compliance problems.  Hypertensive end-organ damage includes kidney disease. Identifiable causes of hypertension include chronic renal disease.   Chronic Kidney Disease   This is a chronic problem. The current episode started more than 1 year ago. The problem occurs constantly. The problem has been gradually improving. Pertinent negatives include no chest pain, nausea, urinary symptoms or vomiting.       Review of Systems   Respiratory: Negative for shortness of breath.    Cardiovascular: Negative for chest pain and leg swelling.   Gastrointestinal: Negative for nausea and vomiting.   Genitourinary: Negative for dysuria, frequency and urgency.          Objective:     /80   Pulse 72   Temp 36.7 °C (98.1 °F)   Resp 16   Ht 1.626 m (5' 4.02\")   Wt 79.8 kg (176 lb)   SpO2 95%   BMI 30.20 kg/m²      Physical Exam   Constitutional: She appears well-developed and well-nourished.   Cardiovascular: Normal rate and regular rhythm.    Pulmonary/Chest: Effort normal. She has no wheezes.   Neurological: She is alert.   Skin: She is not diaphoretic.   Psychiatric: She has a normal mood and affect. Her behavior is normal.   Nursing note and vitals reviewed.              Assessment/Plan:     1. Essential hypertension  Control continue Low Na diet    2. Chronic kidney disease, unspecified CKD stage  Stable  No uremic symptoms  Renal dose " all meds  Avoid nephrotoxins  Annual labs .

## 2017-10-23 ENCOUNTER — OFFICE VISIT (OUTPATIENT)
Dept: MEDICAL GROUP | Facility: CLINIC | Age: 77
End: 2017-10-23
Payer: COMMERCIAL

## 2017-10-23 VITALS
HEART RATE: 73 BPM | TEMPERATURE: 98.2 F | OXYGEN SATURATION: 94 % | RESPIRATION RATE: 14 BRPM | DIASTOLIC BLOOD PRESSURE: 80 MMHG | WEIGHT: 175 LBS | BODY MASS INDEX: 29.88 KG/M2 | HEIGHT: 64 IN | SYSTOLIC BLOOD PRESSURE: 128 MMHG

## 2017-10-23 DIAGNOSIS — E66.9 OBESITY (BMI 30-39.9): ICD-10-CM

## 2017-10-23 DIAGNOSIS — I48.0 PAROXYSMAL ATRIAL FIBRILLATION (HCC): ICD-10-CM

## 2017-10-23 DIAGNOSIS — I48.19 PERSISTENT ATRIAL FIBRILLATION (HCC): ICD-10-CM

## 2017-10-23 DIAGNOSIS — R79.89 HIGH SERUM CREATININE: ICD-10-CM

## 2017-10-23 DIAGNOSIS — I10 ESSENTIAL HYPERTENSION: ICD-10-CM

## 2017-10-23 DIAGNOSIS — E78.49 OTHER HYPERLIPIDEMIA: ICD-10-CM

## 2017-10-23 DIAGNOSIS — N18.30 CKD (CHRONIC KIDNEY DISEASE) STAGE 3, GFR 30-59 ML/MIN (HCC): ICD-10-CM

## 2017-10-23 DIAGNOSIS — E55.9 VITAMIN D DEFICIENCY: ICD-10-CM

## 2017-10-23 DIAGNOSIS — G44.329 CHRONIC POST-TRAUMATIC HEADACHE, NOT INTRACTABLE: ICD-10-CM

## 2017-10-23 PROCEDURE — 99214 OFFICE O/P EST MOD 30 MIN: CPT | Performed by: NURSE PRACTITIONER

## 2017-10-23 RX ORDER — POTASSIUM CHLORIDE 20 MEQ/1
20 TABLET, EXTENDED RELEASE ORAL DAILY
Qty: 90 TAB | Refills: 3 | Status: SHIPPED | OUTPATIENT
Start: 2017-10-23

## 2017-10-23 RX ORDER — INFLUENZA A VIRUS A/MICHIGAN/45/2015 X-275 (H1N1) ANTIGEN (FORMALDEHYDE INACTIVATED), INFLUENZA A VIRUS A/SINGAPORE/INFIMH-16-0019/2016 IVR-186 (H3N2) ANTIGEN (FORMALDEHYDE INACTIVATED), AND INFLUENZA B VIRUS B/MARYLAND/15/2016 BX-69A (A B/COLORADO/6/2017-LIKE VIRUS) ANTIGEN (FORMALDEHYDE INACTIVATED) 60; 60; 60 UG/.5ML; UG/.5ML; UG/.5ML
INJECTION, SUSPENSION INTRAMUSCULAR
Refills: 0 | COMMUNITY
Start: 2017-10-03 | End: 2017-10-23

## 2017-10-23 RX ORDER — RIVAROXABAN 15 MG/1
TABLET, FILM COATED ORAL
Qty: 90 TAB | Refills: 3 | Status: SHIPPED | OUTPATIENT
Start: 2017-10-23 | End: 2017-10-23 | Stop reason: SDUPTHER

## 2017-10-23 NOTE — PROGRESS NOTES
CC: Office Visit (For medication refills; last visit a year ago)        HPI:     Lilia presents today for the followin. history of CKD 3/ history of High serum creatinine  Followed by nephrology (najjar).  She has normal labs at this point. Her kidney doctors can watch her labs yearly at this point given she's done well. She was taken off hydrochlorothiazide which has helped her labs. Additionally he reduced her down to one potassium daily. She states she try to notify the pharmacy of this however they told her they were unable to adjust this.    3. Chronic post-traumatic headache, not intractable  Chronic with no new complaints today    4. Essential hypertension  Stable area to no longer takes hydrochlorothiazide that maintains a good blood pressure    5. Other hyperlipidemia  Stable on gemfibrozil. She is followed by cardiology. She has pending labs via their office.    6. Vitamin D deficiency  She takes 5000 units of vitamin D daily. Labs last year showed her vitamin D levels were getting into the 80s    7. Paroxysmal atrial fibrillation (CMS-HCC)  She is followed by cardiology, she is fairly controlled on sotalol. She is compliant with her also which was reviewed today. We did discuss not to take high-dose aspirin with this medication.    8. Obesity (BMI 30-39.9)  She is above ideal weight for height however her weight is slightly lower today    Current Outpatient Prescriptions   Medication Sig Dispense Refill   • potassium chloride SA (KLOR-CON M20) 20 MEQ Tab CR Take 1 Tab by mouth every day. 90 Tab 3   • trazodone (DESYREL) 150 MG Tab TAKE 1-2 TABS BY MOUTH EVERY BEDTIME. 30 Tab 5   • amitriptyline (ELAVIL) 75 MG Tab TAKE 1 TABLET BY MOUTH EVERY DAY AT BEDTIME. 90 Tab 3   • gemfibrozil (LOPID) 600 MG Tab TAKE 1 TAB BY MOUTH 2 TIMES A DAY. 180 Tab 3   • diazepam (VALIUM) 10 MG tablet TAKE 1 TAB BY MOUTH TWICE A DAY FOR ANXIETY 60 Tab 0   • LOTEMAX 0.5 % Gel INSTILL 1 DROP INTO EACH EYE TWICE DAILY  3   •  "acetaminophen-codeine #3 (TYLENOL #3) 300-30 MG Tab TAKE 1 TABLET BY MOUTH EVERY 6 HOURS AS NEEDED FOR MODERATE PAIN 120 Tab 1   • atorvastatin (LIPITOR) 20 MG Tab Take 1 Tab by mouth every bedtime. 90 Tab 1   • lisinopril (PRINIVIL) 10 MG Tab TAKE 1 TABLET TWICE A  Tab 3   • sotalol (BETAPACE) 80 MG Tab TAKE 1 TABLET TWICE A  Tab 3   • XARELTO 15 MG Tab tablet TAKE 1 TABLET BY MOUTH WITH DINNER. 90 Tab 3     No current facility-administered medications for this visit.      Social History   Substance Use Topics   • Smoking status: Never Smoker   • Smokeless tobacco: Never Used   • Alcohol use No     I reviewed patients allergies, problem list and medications today in EPIC.    ROS: Any/all pertinent positives listed in the HPI, otherwise all others reviewed are negative today.      /80   Pulse 73   Temp 36.8 °C (98.2 °F)   Resp 14   Ht 1.626 m (5' 4\")   Wt 79.4 kg (175 lb)   SpO2 94%   Breastfeeding? No   BMI 30.04 kg/m²     Exam:   Gen: Alert and oriented, No apparent distress. WDWN  Psych: A+Ox3, normal affect and mood  Skin: Warm, dry and intact. Good turgor   No rashes in visible areas.  Eye: Conjunctiva clear, lids normal  ENMT: Lips without lesions  Lungs: Clear to auscultation bilaterally, no rales or rhonchi   Unlabored respiratory effort.   CV: Regular rate and rhythm, S1, S2. No murmurs.   No Edema        Assessment and Plan.   77 y.o. female with the following issues.    1. history of CKD 3/ history of High serum creatinine  Stable. I did discuss with the patient and I encouraged her to roll for auto refill of her medications come from a mail order. I did send a new prescription that showed her potassium is once a day which is more reflective of changes from nephrology.  - potassium chloride SA (KLOR-CON M20) 20 MEQ Tab CR; Take 1 Tab by mouth every day.  Dispense: 90 Tab; Refill: 3    3. Chronic post-traumatic headache, not intractable  Stable    4. Essential " hypertension  Stable, continue current medications    5. Other hyperlipidemia  Stable, continue medication, monitor lab  - LIPID PROFILE; Future    6. Vitamin D deficiency  Stable., Monitor lab. Discussed we may want to do 5,000 units 5 days a week to avoid getting into toxic levels  - VITAMIN D,25 HYDROXY; Future    7. Paroxysmal atrial fibrillation (CMS-HCC)  Stable and followed by cardiology. She is doing well symptom-wise    8. Obesity (BMI 30-39.9)  Healthy lifestyle is encouraged  - Patient identified as having weight management issue.  Appropriate orders and counseling given.

## 2017-10-27 DIAGNOSIS — F13.20 BENZODIAZEPINE DEPENDENCE, CONTINUOUS (HCC): ICD-10-CM

## 2017-10-27 DIAGNOSIS — F41.9 ANXIETY: ICD-10-CM

## 2017-10-29 PROBLEM — F13.20 BENZODIAZEPINE DEPENDENCE, CONTINUOUS (HCC): Status: ACTIVE | Noted: 2017-10-29

## 2017-10-30 RX ORDER — DIAZEPAM 10 MG/1
TABLET ORAL
Qty: 60 TAB | Refills: 4 | Status: SHIPPED
Start: 2017-10-30

## 2017-11-09 ENCOUNTER — HOSPITAL ENCOUNTER (OUTPATIENT)
Dept: LAB | Facility: MEDICAL CENTER | Age: 77
End: 2017-11-09
Attending: NURSE PRACTITIONER
Payer: COMMERCIAL

## 2017-11-09 DIAGNOSIS — E78.49 OTHER HYPERLIPIDEMIA: ICD-10-CM

## 2017-11-09 DIAGNOSIS — E55.9 VITAMIN D DEFICIENCY: ICD-10-CM

## 2017-11-09 LAB
25(OH)D3 SERPL-MCNC: 61 NG/ML (ref 30–100)
CHOLEST SERPL-MCNC: 148 MG/DL (ref 100–199)
HDLC SERPL-MCNC: 73 MG/DL
LDLC SERPL CALC-MCNC: 58 MG/DL
TRIGL SERPL-MCNC: 87 MG/DL (ref 0–149)

## 2017-11-09 PROCEDURE — 82306 VITAMIN D 25 HYDROXY: CPT

## 2017-11-09 PROCEDURE — 80061 LIPID PANEL: CPT

## 2017-11-09 PROCEDURE — 36415 COLL VENOUS BLD VENIPUNCTURE: CPT

## 2017-12-12 DIAGNOSIS — I10 ESSENTIAL HYPERTENSION: ICD-10-CM

## 2017-12-12 DIAGNOSIS — E78.5 HYPERLIPIDEMIA, UNSPECIFIED HYPERLIPIDEMIA TYPE: ICD-10-CM

## 2017-12-12 RX ORDER — GEMFIBROZIL 600 MG/1
600 TABLET, FILM COATED ORAL 2 TIMES DAILY
Qty: 180 TAB | Refills: 3 | Status: SHIPPED | OUTPATIENT
Start: 2017-12-12 | End: 2017-12-14 | Stop reason: SDUPTHER

## 2017-12-14 DIAGNOSIS — E78.5 HYPERLIPIDEMIA, UNSPECIFIED HYPERLIPIDEMIA TYPE: ICD-10-CM

## 2017-12-14 DIAGNOSIS — I10 ESSENTIAL HYPERTENSION: ICD-10-CM

## 2017-12-14 RX ORDER — GEMFIBROZIL 600 MG/1
600 TABLET, FILM COATED ORAL 2 TIMES DAILY
Qty: 180 TAB | Refills: 3 | Status: SHIPPED | OUTPATIENT
Start: 2017-12-14

## 2018-01-15 ENCOUNTER — TELEPHONE (OUTPATIENT)
Dept: MEDICAL GROUP | Facility: CLINIC | Age: 78
End: 2018-01-15

## 2018-01-15 NOTE — TELEPHONE ENCOUNTER
The phone number for the [facility] will not go through.  Called the main number.  Keiry is not there today but will be tomorrow.  I will call her back then

## 2018-01-15 NOTE — TELEPHONE ENCOUNTER
VOICEMAIL  1. Caller Name: Keiry with [facility] office                      Call Back Number: [phone]    2. Message: Checking to see if Pat's overseeing MD will sign pt's death certificate. Fax: [phone]    3. I haven't seen anything in rightfax yet, as of 1/15/18
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
## 2018-01-24 DIAGNOSIS — I48.19 PERSISTENT ATRIAL FIBRILLATION (HCC): ICD-10-CM

## 2018-01-24 RX ORDER — SOTALOL HYDROCHLORIDE 80 MG/1
80 TABLET ORAL 2 TIMES DAILY
Qty: 180 TAB | Refills: 3 | Status: SHIPPED | OUTPATIENT
Start: 2018-01-24

## 2018-02-15 DIAGNOSIS — E78.5 HYPERLIPIDEMIA, UNSPECIFIED HYPERLIPIDEMIA TYPE: ICD-10-CM

## 2018-02-15 DIAGNOSIS — I10 ESSENTIAL HYPERTENSION: ICD-10-CM

## 2018-02-15 RX ORDER — ATORVASTATIN CALCIUM 20 MG/1
TABLET, FILM COATED ORAL
Qty: 90 TAB | Refills: 2 | Status: SHIPPED | OUTPATIENT
Start: 2018-02-15

## 2019-11-29 NOTE — TELEPHONE ENCOUNTER
Was the patient seen in the last year in this department? Yes     Does patient have an active prescription for medications requested? Yes     Received Request Via: Pharmacy    
No
Yes

## 2021-01-15 DIAGNOSIS — Z23 NEED FOR VACCINATION: ICD-10-CM
